# Patient Record
Sex: FEMALE | Race: WHITE | URBAN - METROPOLITAN AREA
[De-identification: names, ages, dates, MRNs, and addresses within clinical notes are randomized per-mention and may not be internally consistent; named-entity substitution may affect disease eponyms.]

---

## 2021-09-14 ENCOUNTER — HOSPITAL ENCOUNTER (OUTPATIENT)
Facility: HOSPITAL | Age: 26
Setting detail: OBSERVATION
Discharge: HOME | End: 2021-09-14
Attending: OBSTETRICS & GYNECOLOGY | Admitting: OBSTETRICS & GYNECOLOGY
Payer: COMMERCIAL

## 2021-09-14 VITALS
TEMPERATURE: 98 F | SYSTOLIC BLOOD PRESSURE: 118 MMHG | BODY MASS INDEX: 26.68 KG/M2 | HEIGHT: 62 IN | HEART RATE: 100 BPM | RESPIRATION RATE: 18 BRPM | WEIGHT: 145 LBS | DIASTOLIC BLOOD PRESSURE: 68 MMHG

## 2021-09-14 PROBLEM — Z34.90 PREGNANCY: Status: ACTIVE | Noted: 2021-09-14

## 2021-09-14 PROCEDURE — 99203 OFFICE O/P NEW LOW 30 MIN: CPT

## 2021-09-14 RX ORDER — FAMOTIDINE 20 MG
TABLET ORAL
COMMUNITY

## 2021-09-14 NOTE — NST
Nonstress Test   Patient: Hortencia Overall    Gestation: 26w1d    NST:       Variability: Moderate           Accelerations: Yes           Decelerations: None            Baseline: 145 BPM           Uterine Irritability: Yes (pt denies feeling cramping/ti

## 2021-09-14 NOTE — PROGRESS NOTES
Discharge instructions reviewed with pt & spouse. Pt has a follow up appt with Dr Miguelangel Alvares tomorrow in her office. Pt discharged in stable condition, not in active labor.

## 2021-09-14 NOTE — PROGRESS NOTES
Pt , edc 21 (26 1/7 wks) admitted to triage rm 1 with c/o decreased fetal movement. PT states she may have baby moving around 0600 but not sure and she has not felt fetal movement since.  Pt denies vag bleeding, leaking of fluid or uterine crampin

## 2025-08-25 ENCOUNTER — TELEPHONE (OUTPATIENT)
Dept: OBGYN | Age: 30
End: 2025-08-25

## 2025-09-18 ENCOUNTER — APPOINTMENT (OUTPATIENT)
Dept: OBGYN | Age: 30
End: 2025-09-18

## 2025-10-31 ENCOUNTER — APPOINTMENT (OUTPATIENT)
Dept: MATERNAL FETAL MEDICINE | Age: 30
End: 2025-10-31

## 2025-10-31 ENCOUNTER — APPOINTMENT (OUTPATIENT)
Dept: OBGYN | Age: 30
End: 2025-10-31